# Patient Record
Sex: FEMALE | Race: OTHER | HISPANIC OR LATINO | URBAN - METROPOLITAN AREA
[De-identification: names, ages, dates, MRNs, and addresses within clinical notes are randomized per-mention and may not be internally consistent; named-entity substitution may affect disease eponyms.]

---

## 2017-09-20 ENCOUNTER — EMERGENCY (EMERGENCY)
Facility: HOSPITAL | Age: 52
LOS: 1 days | Discharge: HOME | End: 2017-09-20
Attending: EMERGENCY MEDICINE
Payer: COMMERCIAL

## 2017-09-20 VITALS
HEART RATE: 80 BPM | DIASTOLIC BLOOD PRESSURE: 77 MMHG | SYSTOLIC BLOOD PRESSURE: 104 MMHG | WEIGHT: 125 LBS | RESPIRATION RATE: 16 BRPM | HEIGHT: 65 IN | OXYGEN SATURATION: 100 % | TEMPERATURE: 97 F

## 2017-09-20 DIAGNOSIS — R21 RASH AND OTHER NONSPECIFIC SKIN ERUPTION: ICD-10-CM

## 2017-09-20 DIAGNOSIS — Z88.3 ALLERGY STATUS TO OTHER ANTI-INFECTIVE AGENTS: ICD-10-CM

## 2017-09-20 DIAGNOSIS — E78.5 HYPERLIPIDEMIA, UNSPECIFIED: ICD-10-CM

## 2017-09-20 DIAGNOSIS — A52.16 CHARCOT'S ARTHROPATHY (TABETIC): ICD-10-CM

## 2017-09-20 PROCEDURE — 99283 EMERGENCY DEPT VISIT LOW MDM: CPT

## 2017-09-20 RX ORDER — HYDROCORTISONE 1 %
1 OINTMENT (GRAM) TOPICAL
Qty: 1 | Refills: 0 | OUTPATIENT
Start: 2017-09-20 | End: 2017-09-27

## 2017-09-20 RX ADMIN — Medication 50 MILLIGRAM(S): at 13:55

## 2017-09-20 NOTE — ED ADULT NURSE NOTE - OBJECTIVE STATEMENT
AOX3 +ambulatory patient reports AOX3 +ambulatory patient reports rash on papito buttocks today. Patient denies any sob, did not used new lotions or soap. Patient seen and examined by GENE De Luna with [facility].

## 2017-09-20 NOTE — ED PROVIDER NOTE - PROGRESS NOTE DETAILS
No e/o erythema multiforme, SJS/TEN, Lyme, cellulitis, necrotizing fasciitis, no angioedema, meningococcemia, jose mountain spotted fever. Will refer to derm in 1-2 days and give Rx for Hydrocortisone cream. Pt is well appearing walking with steady gait, stable for discharge and follow up without fail with medical doctor. I had a detailed discussion with the patient and/or guardian regarding the historical points, exam findings, and any diagnostic results supporting the discharge diagnosis. Pt educated on care and need for follow up. Strict return instructions and red flag signs and symptoms discussed with patient. Questions answered. Pt shows understanding of discharge information and agrees to follow.

## 2017-09-20 NOTE — ED PROVIDER NOTE - ATTENDING CONTRIBUTION TO CARE
I performed a face to face bedside interview with patient regarding history of present illness, review of symptoms and past medical history. I completed an independent physical exam.  I have discussed patient's plan of care.   I agree with note as stated above, having amended the EMR as needed to reflect my findings. I have discussed the assessment and plan of care.  This includes during the time I functioned as the attending physician for this patient.  Attending Contribution to Care: agree with plan of NP. pt stable, p/w vague plaque rash diffusely. no change of meds. pt with no signs of CANDI chavez, no exposure to new things. not appearing similar to bug bite. pt referred to derm for further eval. PO steroids started by np. pt tolerated.

## 2017-09-20 NOTE — ED PROVIDER NOTE - MEDICAL DECISION MAKING DETAILS
51 year-old female, presents with rash to buttocks, breast and thigh since today. Well-appearing, No e/o erythema multiforme, SJS/TEN, Lyme, cellulitis, necrotizing fasciitis, no angioedema, meningococcemia, jose mountain spotted fever. Plan: Hydrocortisone crea RX and derm follow up in 1-2 days.

## 2017-09-20 NOTE — ED PROVIDER NOTE - OBJECTIVE STATEMENT
51 year-old female, history of dyslipidemia, Charcot-Jolene disease, presents with cc rash today. Sudden onset of rash noticed on both buttocks, breast and right lateral thigh today. Rash is pruritic with mild pain. Denies fever, chills, body aches, joint pain, recent travel/exposure to woods or insect bites, new product uses, similar rash in other members of household.

## 2017-09-20 NOTE — ED PROVIDER NOTE - PHYSICAL EXAMINATION
Round well-circumscribed red patches on both buttocks, 1 patch on right breast and 1 patch right lateral thigh. Non-tender, not raised, non-streaking, nondraining.

## 2018-10-03 ENCOUNTER — RESULT REVIEW (OUTPATIENT)
Age: 53
End: 2018-10-03

## 2019-03-22 NOTE — ED PROVIDER NOTE - CROS ED CONS ALL NEG
Pt wife reports that she is not familiar with driving downtown and does not believe she would be able to get Nasreen Jimenez down to [address]. She is inquiring if PCP has any recommendation for a geriatrician closer to her location or if general referral can be placed. Also she wants to confirm with B12 result if patient is to continue injections. Okay for 3/25/19. Please see Glendy's concerns above and confirm MD recommendations. negative...

## 2019-04-10 PROBLEM — E78.5 HYPERLIPIDEMIA, UNSPECIFIED: Chronic | Status: ACTIVE | Noted: 2017-09-20

## 2019-04-10 PROBLEM — G60.0 HEREDITARY MOTOR AND SENSORY NEUROPATHY: Chronic | Status: ACTIVE | Noted: 2017-09-20

## 2019-04-29 ENCOUNTER — APPOINTMENT (OUTPATIENT)
Dept: ORTHOPEDIC SURGERY | Facility: CLINIC | Age: 54
End: 2019-04-29
Payer: MEDICARE

## 2019-04-29 VITALS
SYSTOLIC BLOOD PRESSURE: 108 MMHG | WEIGHT: 127 LBS | BODY MASS INDEX: 21.68 KG/M2 | HEIGHT: 64 IN | DIASTOLIC BLOOD PRESSURE: 69 MMHG | HEART RATE: 82 BPM

## 2019-04-29 DIAGNOSIS — Z78.9 OTHER SPECIFIED HEALTH STATUS: ICD-10-CM

## 2019-04-29 DIAGNOSIS — Z87.39 PERSONAL HISTORY OF OTHER DISEASES OF THE MUSCULOSKELETAL SYSTEM AND CONNECTIVE TISSUE: ICD-10-CM

## 2019-04-29 DIAGNOSIS — K08.9 DISORDER OF TEETH AND SUPPORTING STRUCTURES, UNSPECIFIED: ICD-10-CM

## 2019-04-29 DIAGNOSIS — Z82.61 FAMILY HISTORY OF ARTHRITIS: ICD-10-CM

## 2019-04-29 DIAGNOSIS — Z86.39 PERSONAL HISTORY OF OTHER ENDOCRINE, NUTRITIONAL AND METABOLIC DISEASE: ICD-10-CM

## 2019-04-29 PROCEDURE — 73030 X-RAY EXAM OF SHOULDER: CPT | Mod: LT

## 2019-04-29 PROCEDURE — 99205 OFFICE O/P NEW HI 60 MIN: CPT

## 2019-04-29 RX ORDER — PRAVASTATIN SODIUM 20 MG/1
20 TABLET ORAL
Refills: 0 | Status: ACTIVE | COMMUNITY

## 2019-04-29 RX ORDER — CHROMIUM 200 MCG
TABLET ORAL
Refills: 0 | Status: ACTIVE | COMMUNITY

## 2019-04-29 NOTE — HISTORY OF PRESENT ILLNESS
[de-identified] : CC Left shoulder\par \par HPI 54 yo female right HD presents with acute onset of 3 months of constant pain in the lateral superior Left shoulder [without injury]. The pain is same, and rated a 7 out of 10, described as sharp and burning, [without radiation]. Rest makes the pain better and shoulder motion makes the pain worse. The patient reports associated symptoms of locking and weakness. The patient + pain at night affecting sleep, + mild neck pain, and - similar pain previously.\par \par The patient has tried the following treatments:\par Activity modification	+\par Ice			+\par Nsaids    		+\par Physical Therapy  	-\par Cortisone Injection	-\par Arthroscopy/Surgery	-\par \par Review of Systems is positive for the above musculoskeletal symptoms and is otherwise non-contributory for general, constitutional, psychiatric, neurologic, HEENT, cardiac, respiratory, gastrointestinal, reproductive, lymphatic, and dermatologic complaints.\par \par Consult by Dr Any Montes

## 2019-04-29 NOTE — PHYSICAL EXAM
[de-identified] : Physical Examination\par General: well nourished, in no acute distress, alert and oriented to person, place and time\par Psychiatric: normal mood and affect, no abnormal movements or speech patterns\par Eyes: vision intact - glasses\par Throat: no thyromegaly\par Lymph: no enlarged nodes, no lymphedema in extremity\par Respiratory: no wheezing, no shortness of breath with ambulation\par Cardiac: no cardiac leg swelling, 2+ peripheral pulses\par Neurology: normal gross sensation in extremities to light touch\par Abdomen: soft, non-tender, tympanic, no masses\par \par Musculoskeletal Examination\par Cervical spine	Full painless range of motion and = Spurling's test\par \par Shoulder			Right			Left\par Appearance\par      Skin/Swelling/Deformity	normal			normal\par      Scapular Winging		-			-\par Range of Motion\par      Forward Flexion		170 / 170		170 / 170\par      Abduction			170 / 170		170 / 170\par      External Rotation		45			45\par      Internal Rotation		T10			T10\par      SAbd Ext Rotation		90			90\par      SAbd Int Rotation		80			80\par      Painful Arc			-			+\par      Crepitus			-			-\par Palpation\par      Clavicle			-			-\par      AC Joint			-			-\par      Posterior Acromion		-			-\par      Levator Scapula		-			-\par      Lateral Bursa			-			+\par      Impingement Area		-			-\par      Biceps Tendon		-			-\par      Anterior Capsule		-			-\par Strength Examination\par      Supraspinatous 		5+ / 0			5+ / +\par      Infraspinatous			5+ / 0			5+ / +\par      Subscapularis			5+ / 0			5+ / 0\par      Belly Press			5+ / 0			5+ / 0\par      Lift Off			-			-\par      Drop-Arm			-			-\par Special Examination\par      Biceps [place]'s		-			-\par      Impingement Neer		-			-\par      Impingement Hawking		-			-\par \par      Apprehension			-			-\par           Suppression Appre		-			-\par      Anterior Subluxation		-			-\par      Posterior Subluxation		-			-\par      AC Cross-Body\par           Anterior			-			-\par           Posterior			-			-\par \par Sensation\par      Axillary			normal			normal\par      LatAntCubBrach 		normal			normal\par      Median 			normal			normal\par      Ulnar 			normal			normal\par      Radial 			normal			normal\par Motor\par      AIN 				normal			normal\par      Ulnar 			normal			normal\par      Radial 			normal			normal\par      PIN 				normal			normal\par Pulses\par      Radial			2+			2+ [de-identified] : 4 views of the affected Left shoulder (AP, Glenoid, Y-View, Axillary)\par were ordered, obtained and evaluated by myself today and\par demonstrate:\par normal bony calcification without dislocation and no fracture\par 	Arch	2B\par 	AC Joint	no Arthrosis\par 	GH Joint	trace Arthrosis\par 	Calcifications	none

## 2019-04-29 NOTE — DISCUSSION/SUMMARY
[de-identified] : Left shoulder rotator cuff syndrome\par \par \par I discussed my findings on history, exam and radiology.\par \par I reviewed the anatomy and function of the shoulder rotator cuff muscles and tendons, biceps tendon and labrum. Given the current findings for the patient, I recommend proceeding with non-operative management of the shoulder consisting of the following:\par \par Patient education about the shoulder motions causing pain and possibly injury for activity modification\par \par Ice or warm compress\par \par Physical therapy prescription with shoulder rotator cuff strengthening, aysha-scapular stabilization, ROM stretching, mobilization, modalities, HEP\par \par The patient was prescribed Diclofenac PO non-steroidal anti-inflammatory medication. 50mg tablets twice daily to be taken for at least 1-2 weeks in a row and then PRN afterwards. Risks and benefits were discussed and include but not limited to renal damage and GI ulceration and bleeding.  They were advised to take with food to limit stomach upset as well as warned to stop the medication if worsening gastric pain or dizziness or other side effects. Also to immediately stop the medication and seek appropriate medical attention if any severe stomach ache, gastritis, black/red vomit, black/red stools or any other medical concern.\par \par Patient declined a corticosteroid injection today\par \par The patient verifies their understanding the the visit, diagnosis and plan. They agree with the treatment plan and will contact the office with any questions or problems.\par \par FU after PT completion if unimproved

## 2019-11-04 ENCOUNTER — APPOINTMENT (OUTPATIENT)
Dept: ORTHOPEDIC SURGERY | Facility: CLINIC | Age: 54
End: 2019-11-04
Payer: MEDICARE

## 2019-11-04 DIAGNOSIS — M75.42 IMPINGEMENT SYNDROME OF LEFT SHOULDER: ICD-10-CM

## 2019-11-04 DIAGNOSIS — M75.22 BICIPITAL TENDINITIS, LEFT SHOULDER: ICD-10-CM

## 2019-11-04 DIAGNOSIS — M75.102 UNSPECIFIED ROTATOR CUFF TEAR OR RUPTURE OF LEFT SHOULDER, NOT SPECIFIED AS TRAUMATIC: ICD-10-CM

## 2019-11-04 PROCEDURE — 99214 OFFICE O/P EST MOD 30 MIN: CPT | Mod: 25

## 2019-11-04 PROCEDURE — 20610 DRAIN/INJ JOINT/BURSA W/O US: CPT | Mod: LT

## 2019-11-04 NOTE — HISTORY OF PRESENT ILLNESS
[de-identified] : CC Left shoulder\par \par HPI 54 yo female right HD presents for PT and nsids FU of acute onset of 3 months of constant pain in the lateral superior Left shoulder [without injury]. The pain is same, and rated a 7 out of 10, described as sharp and burning, [without radiation]. Rest makes the pain better and shoulder motion makes the pain worse. The patient reports associated symptoms of locking and weakness. The patient + pain at night affecting sleep, + mild neck pain, and - similar pain previously.\par \par The patient has tried the following treatments:\par Activity modification	+\par Ice			+\par Nsaids    		+\par Physical Therapy  	+ 3 months helped\par Cortisone Injection	-\par Arthroscopy/Surgery	-\par \par PT helped pain, but after stopping, the pain and weakness worsening\par Review of Systems is positive for the above musculoskeletal symptoms and is otherwise non-contributory for general, constitutional, psychiatric, neurologic, HEENT, cardiac, respiratory, gastrointestinal, reproductive, lymphatic, and dermatologic complaints.\par \par Consult by Dr Any Montes

## 2019-11-04 NOTE — PHYSICAL EXAM
[de-identified] : Physical Examination\par General: well nourished, in no acute distress, alert and oriented to person, place and time\par Psychiatric: normal mood and affect, no abnormal movements or speech patterns\par Eyes: vision intact - glasses\par Throat: no thyromegaly\par Lymph: no enlarged nodes, no lymphedema in extremity\par Respiratory: no wheezing, no shortness of breath with ambulation\par Cardiac: no cardiac leg swelling, 2+ peripheral pulses\par Neurology: normal gross sensation in extremities to light touch\par Abdomen: soft, non-tender, tympanic, no masses\par \par Musculoskeletal Examination\par Cervical spine	Full painless range of motion and = Spurling's test\par \par Shoulder			Right			Left\par Appearance\par      Skin/Swelling/Deformity	normal			normal\par      Scapular Winging		-			-\par Range of Motion\par      Forward Flexion		170 / 170		170 / 170\par      Abduction			170 / 170		170 / 170\par      External Rotation		45			45\par      Internal Rotation		T10			T10\par      SAbd Ext Rotation		90			90\par      SAbd Int Rotation		80			80\par      Painful Arc			-			+\par      Crepitus			-			-\par Palpation\par      Clavicle			-			-\par      AC Joint			-			-\par      Posterior Acromion		-			-\par      Levator Scapula		-			-\par      Lateral Bursa			-			+\par      Impingement Area		-			-\par      Biceps Tendon		-			-\par      Anterior Capsule		-			-\par Strength Examination\par      Supraspinatous 		5+ / 0			5- / +\par      Infraspinatous			5+ / 0			5- / +\par      Subscapularis			5+ / 0			5+ / 0\par      Belly Press			5+ / 0			5+ / 0\par      Lift Off			-			-\par      Drop-Arm			-			-\par Special Examination\par      Biceps [place]'s		-			+ up\par      Impingement Neer		-			+\par      Impingement Hawking		-			+\par \par      Apprehension			-			-\par           Suppression Appre		-			-\par      Anterior Subluxation		-			-\par      Posterior Subluxation		-			-\par      AC Cross-Body\par           Anterior			-			-\par           Posterior			-			-\par \par Sensation\par      Axillary			normal			normal\par      LatAntCubBrach 		normal			normal\par      Median 			normal			normal\par      Ulnar 			normal			normal\par      Radial 			normal			normal\par Motor\par      AIN 				normal			normal\par      Ulnar 			normal			normal\par      Radial 			normal			normal\par      PIN 				normal			normal\par Pulses\par      Radial			2+			2+ [de-identified] : 4 views of the affected Left shoulder (AP, Glenoid, Y-View, Axillary)\par 4-29-19\par demonstrate:\par normal bony calcification without dislocation and no fracture\par 	Arch	2B\par 	AC Joint	no Arthrosis\par 	GH Joint	trace Arthrosis\par 	Calcifications	none

## 2019-11-04 NOTE — DISCUSSION/SUMMARY
[de-identified] : Left shoulder rotator cuff syndrome, biceps tendonitis and impingement syndrome\par \par \par I discussed my findings on history, exam and radiology.\par \par I reviewed the anatomy and function of the shoulder rotator cuff muscles and tendons, biceps tendon and labrum. Given the current findings for the patient, I recommend proceeding with non-operative management of the shoulder consisting of the following:\par \par Patient education about the shoulder motions causing pain and possibly injury for activity modification\par \par Ice or warm compress\par \par Physical therapy prescription with shoulder rotator cuff strengthening, aysha-scapular stabilization, ROM stretching, mobilization, modalities, HEP\par \par The patient was prescribed Diclofenac PO non-steroidal anti-inflammatory medication. 50mg tablets twice daily to be taken for at least 1-2 weeks in a row and then PRN afterwards. Risks and benefits were discussed and include but not limited to renal damage and GI ulceration and bleeding.  They were advised to take with food to limit stomach upset as well as warned to stop the medication if worsening gastric pain or dizziness or other side effects. Also to immediately stop the medication and seek appropriate medical attention if any severe stomach ache, gastritis, black/red vomit, black/red stools or any other medical concern.\par \par \par Patient is not yet ready to proceed with operative management we'll hold on discussion of MRI pending another course of nonoperative management\par \par Procedure Note:\par \par Risks and benefits of a corticosteroid injection of the left shoulder subacromial bursa were discussed with the patient. Potential adverse effects were discussed including but not limited to bleeding, skin/ joint infection, local skin reactions including bleaching, bruising, stiffness, soreness, vasovagal episodes, transient hyperglycemia, avascular necrosis, pseudo-septic type reactions, post injection joint pain, allergic reaction to product or anesthetic and other rare but potential adverse effects along with benefits including decreased pain and improved stability prior to obtaining verbal informed consent. It was also discussed that for some patients the treatment is ineffective and there are no guarantees that the patient will experience improvement as the result of the injection. In rare occasions the injection can cause worsening of pain.\par \par After verbal consent, the posterolateral injection site was identified after palpating the acromion. The injection site was marked and prepped with a ChloraPrep swab and anesthetized with ethylchloride skin anesthesia. Under sterile technique a 22g 1 1/2 in needle with 3 cc total of 1cc 1% lidocaine without epinephrine, 1cc 0.25% Marcaine without epinephrine and 1cc of Kenalog 40mg/cc was passed through the injection site towards the subacromial space The medication was injected without resistance. The injection site was sterilely dressed, there was minimal blood loss. The patient tolerated this procedure without any complications done by myself.\par \par The patient has been advised that if they notice any worsening of symptoms or any problems to contact me and seek care from a qualified medical professional. The patient was instructed to ice the shoulder and take NSAID medication on an as needed basis if the patient feels discomfort.\par \par The patient verifies their understanding the the visit, diagnosis and plan. They agree with the treatment plan and will contact the office with any questions or problems.\par \par FU after PT completion if unimproved

## 2021-02-25 ENCOUNTER — APPOINTMENT (OUTPATIENT)
Dept: SURGERY | Facility: CLINIC | Age: 56
End: 2021-02-25
Payer: MEDICARE

## 2021-02-25 VITALS
HEART RATE: 90 BPM | DIASTOLIC BLOOD PRESSURE: 73 MMHG | HEIGHT: 64 IN | SYSTOLIC BLOOD PRESSURE: 120 MMHG | WEIGHT: 133 LBS | BODY MASS INDEX: 22.71 KG/M2

## 2021-02-25 PROCEDURE — 99072 ADDL SUPL MATRL&STAF TM PHE: CPT

## 2021-02-25 PROCEDURE — 99203 OFFICE O/P NEW LOW 30 MIN: CPT

## 2021-02-25 RX ORDER — DICLOFENAC SODIUM 50 MG/1
50 TABLET, DELAYED RELEASE ORAL
Qty: 60 | Refills: 1 | Status: DISCONTINUED | COMMUNITY
Start: 2019-04-29 | End: 2021-02-25

## 2021-02-25 RX ORDER — DICLOFENAC SODIUM 50 MG/1
50 TABLET, DELAYED RELEASE ORAL
Qty: 30 | Refills: 1 | Status: DISCONTINUED | COMMUNITY
Start: 2019-11-04 | End: 2021-02-25

## 2021-02-25 NOTE — ASSESSMENT
[FreeTextEntry1] : Patient is a 55 y.o F who presents with cc breast pain and itching x 1 month. Breast imaging studies reviewed. No evidence of malignancy with BI RADS cat 3 finding. Clinically, negative breast exam.

## 2021-02-25 NOTE — CONSULT LETTER
[Dear  ___] : Dear  [unfilled], [Consult Letter:] : I had the pleasure of evaluating your patient, [unfilled]. [Consult Closing:] : Thank you very much for allowing me to participate in the care of this patient.  If you have any questions, please do not hesitate to contact me. [Sincerely,] : Sincerely, [FreeTextEntry3] : Juventino Manuel MD\par

## 2021-02-25 NOTE — HISTORY OF PRESENT ILLNESS
[de-identified] : MARE AGUIRRE is a 55 year old F who is referred to the office for consultation visit, she presents w the cc of having breast pain. Patient had a BL mammo and breast US, 01/28/21, with benign appearing findings in both breasts, BI RADS cat 3. Patient also states she is feeling some itching to both breasts, for about 1 month. She denies feeling any breast lumps or nipple discharge. No personal or family history of breast CA. No hx of previous breast biopsy. Patient has reached menopause x 10 years.

## 2021-02-25 NOTE — PLAN
[FreeTextEntry1] : surgical intervention not indicated at this time ; \par continue with SBE and annual screening \par \par Patient's questions and concerns addressed to their satisfaction, and patient verbalized an understanding of the information discussed.\par \par patient will follow up if needed. \par

## 2021-02-25 NOTE — PHYSICAL EXAM
[Normal Breath Sounds] : Normal breath sounds [Normal Rate and Rhythm] : normal rate and rhythm [No Rash or Lesion] : No rash or lesion [Alert] : alert [Oriented to Person] : oriented to person [Oriented to Place] : oriented to place [Oriented to Time] : oriented to time [Calm] : calm [JVD] : no jugular venous distention  [de-identified] : A/Ox3; NAD. appears comfortable [de-identified] : EOMI; sclera anicteric. Nasal mucosa pink, septum midline. Oral mucosa pink. Tongue midline, Pharynx without exudates. [de-identified] : No mass felt in either breasts, Nipple areolar complex with no abnormalities,No skin thickening,No nipple discharge,No regional lymphadenopathy [de-identified] : Abd is soft, nondistended, no rebound or guarding. No abdominal masses. No abdominal tenderness.\par  [de-identified] : +ROM, no joint swelling

## 2021-02-25 NOTE — REVIEW OF SYSTEMS
[As Noted in HPI] : as noted in HPI [Itching] : itching [Breast Pain] : breast pain [Negative] : Heme/Lymph [Breast Lump] : no breast lump

## 2021-02-25 NOTE — DATA REVIEWED
[FreeTextEntry1] : Date of Exam: 01-\par  \par EXAM: DIGITAL BILATERAL DIAGNOSTIC MAMMOGRAM AND TOMOSYNTHESIS AND BREAST ULTRASOUND\par \par HISTORY: The patient is 55 years old and is seen for bilateral breast pain for about 2 weeks.\par \par CLINICAL BREAST EXAMINATION: The patient reports that her last clinical breast exam was within the past year. \par \par COMPARISON: The present examination has been compared to prior breast imaging studies dating back to 2018 \par \par MAMMOGRAM:\par \par TECHNIQUE: Full-field digital mammography of bilateral breasts was obtained. CC, mediolateral oblique views of bilateral breasts were obtained. Mediolateral view of the bilateral breasts was obtained. Spot compression views over the pain areas in bilateral breasts were performed in CC, mediolateral oblique projections.  Low-dose full-field digital breast tomosynthesis examination was performed with 3D acquisitions and C-View synthesized 2D reconstructed images. Computer-aided detection (CAD) was utilized. \par \par FINDINGS:\par BREAST COMPOSITION: The breasts are heterogeneously dense, which may obscure small masses.\par \par No suspicious calcifications, nodule, architectural distortion or skin thickening are seen in either side. Benign-appearing calcifications are seen bilaterally.  No suspicious findings are seen in the pain areas of breasts. \par \par BREAST ULTRASOUND:  \par \par TECHNIQUE: Bilateral breast ultrasound was performed \par \par FINDINGS: Comparison is made with the prior study dated 6/4/2020, 5/23/2016.\par \par \par On the right, an equal density circumscribed nodule or fat lobule is seen at 12:00, 3 cm from the nipple, the pain area measuring 4 x 2 x 2 mm. At 12-3 o'clock, the pain area as per patient, no sonographic abnormality is seen. At 10:00, 7 cm from the nipple, the area, a circumscribed nodule measures 6 x 2 x 3 mm. At 11:00 right breast, the pain area, and no sonographic abnormality is seen. At retroareolar region, the pain area, no sonographic abnormality is seen.\par \par On the left, No suspicious finding is seen at 12:00 to 3:00, the pain area. The previous noted nodule at 7:00, 5 cm from the nipple is not seen on the current study. A cyst is seen at 9:00, 2 cm from the nipple measuring 3 x 3 x 4 mm which is stable. No suspicious finding is seen in the retroareolar region, the pain area. In the left axilla, 13 cm from the nipple, the pain area, a normal-appearing lymph node with fatty hilum measures 1.1 x 0.4 x 0.8 cm. There is no evidence of hilar lymphadenopathy.\par \par IMPRESSION: \par 1. No mammographic evidence of malignancy in either breast.\par 2. Benign-appearing findings in bilateral breasts. Six-month follow-up targeted bilateral breast ultrasound is recommended. \par \par FOLLOW-UP: Follow-up imaging in 6 months. \par ASSESSMENT: BI-RADS Category 3:  Probably benign. \par

## 2021-03-16 ENCOUNTER — APPOINTMENT (OUTPATIENT)
Dept: NEUROLOGY | Facility: CLINIC | Age: 56
End: 2021-03-16
Payer: MEDICARE

## 2021-03-16 VITALS
BODY MASS INDEX: 22.88 KG/M2 | SYSTOLIC BLOOD PRESSURE: 96 MMHG | HEIGHT: 64 IN | OXYGEN SATURATION: 98 % | WEIGHT: 134 LBS | DIASTOLIC BLOOD PRESSURE: 54 MMHG | HEART RATE: 73 BPM | TEMPERATURE: 97.9 F

## 2021-03-16 PROCEDURE — 99072 ADDL SUPL MATRL&STAF TM PHE: CPT

## 2021-03-16 PROCEDURE — 99205 OFFICE O/P NEW HI 60 MIN: CPT

## 2021-03-16 RX ORDER — OMEPRAZOLE 10 MG/1
10 CAPSULE, DELAYED RELEASE ORAL
Refills: 0 | Status: DISCONTINUED | COMMUNITY
End: 2021-03-16

## 2021-03-16 RX ORDER — AMITRIPTYLINE HYDROCHLORIDE 25 MG/1
25 TABLET, FILM COATED ORAL
Refills: 0 | Status: ACTIVE | COMMUNITY

## 2021-03-16 RX ORDER — ALENDRONATE SODIUM 35 MG/1
35 TABLET ORAL
Refills: 0 | Status: ACTIVE | COMMUNITY

## 2021-03-16 RX ORDER — GABAPENTIN 100 MG/1
100 CAPSULE ORAL
Refills: 0 | Status: DISCONTINUED | COMMUNITY
End: 2021-03-16

## 2021-03-16 NOTE — HISTORY OF PRESENT ILLNESS
[FreeTextEntry1] : The patient is here to establish acre for CMT and be evaluated for memory problems.  She has CMT , presumed type 1 demyelinating Dx in 2008.  She used to walk on her toes as a child, she has never been able to run and has had lifelong hammertoes and bl pes cavus.  She wears AFO's bl  since 2010.  She has had falls.  Has numbness and tingling.  Has weakness.  Symptoms present in hands and legs.  \par \par Son had symptoms at age 4-5.  Has 2 other healthy sons without sings of CMT.\par Father had symptoms, age of onset unknown.\par \par The patient had problems with breathing in the past, now doing well.  No cardiac problems.  Has dry eyes.  \par \par She has memory problems since 2019.  Leaves the stove on.  Got lost in a park that she regularly goes to and going to her mother's house. Has been having trouble with doing the bills.  No trouble with ADLs.  Grandmother, aunt and mother with dementia.  No symptoms of CVA.  Had bladder incontinence in 2020, now improved, no bowel incontinence.

## 2021-03-16 NOTE — ASSESSMENT
[FreeTextEntry1] : The patient has prior Dx of DMT1 via NCS/emg, she has not had genetic testing.  On exam, her proximal strength intact in bl arms and legs, bl EDC, FDI, APB and AMD 4/5; PF 4/5 bl and DF 2/5 and EDB?EHL 2/5, she last sensory loss to small and large fiber with gait balance problems, as well as hammertoes and bl pes cavus.  \par Will plan to repeat ncs,emg of arms and legs and get CTM genetic testing from [facility].\par \par Memory problems of unclear etiology with MMSE 30/30.  Will get MRI brain to r/o cva and labs for reversible causes of dementia.  WIll consider MOCA at next visit.

## 2021-03-16 NOTE — PHYSICAL EXAM
[General Appearance - Alert] : alert [General Appearance - In No Acute Distress] : in no acute distress [Person] : oriented to person [Place] : oriented to place [Time] : oriented to time [Registration Intact] : recent registration memory intact [Concentration Intact] : normal concentrating ability [Naming Objects] : no difficulty naming common objects [Fluency] : fluency intact [Comprehension] : comprehension intact [Vocabulary] : adequate range of vocabulary [Total Score ___ / 30] : the patient achieved a score of [unfilled] /30 [Date / Time ___ / 5] : date / time [unfilled] / 5 [Place ___ / 5] : place [unfilled] / 5 [Registration ___ / 3] : registration [unfilled] / 3 [Serial Sevens ___/5] : serial sevens [unfilled] / 5 [Naming 2 Objects ___ / 2] : naming two objects [unfilled] / 2 [Repeating a Sentence ___ / 1] : repeating a sentence [unfilled] / 1 [Writing a Sentence ___ / 1] : write sentence [unfilled] / 1 [3-stage Verbal Command ___ / 3] : three-stage verbal command [unfilled] / 3 [Written Command ___ / 1] : written command [unfilled] / 1 [Copy a Design ___ / 1] : copy a design [unfilled] / 1 [Recall ___ / 3] : recall [unfilled] / 3 [Cranial Nerves Optic (II)] : visual acuity intact bilaterally,  visual fields full to confrontation, pupils equal round and reactive to light [Cranial Nerves Oculomotor (III)] : extraocular motion intact [Cranial Nerves Trigeminal (V)] : facial sensation intact symmetrically [Cranial Nerves Facial (VII)] : face symmetrical [Cranial Nerves Vestibulocochlear (VIII)] : hearing was intact bilaterally [Cranial Nerves Glossopharyngeal (IX)] : tongue and palate midline [Cranial Nerves Accessory (XI - Cranial And Spinal)] : head turning and shoulder shrug symmetric [Cranial Nerves Hypoglossal (XII)] : there was no tongue deviation with protrusion [Motor Tone] : muscle tone was normal in all four extremities [Involuntary Movements] : no involuntary movements were seen [Vibration Decrease Leg / Foot Toes Both Feet] : decreased at the toes of both feet  [Position Sensation Decrease Leg/Foot At Level Of Toes] : impaired at the toes in both legs [1+] : Patella left 1+ [0] : Ankle jerk left 0 [Short Term Intact] : short term memory impaired [Coordination - Dysmetria Impaired Finger-to-Nose Bilateral] : not present [Coordination - Dysmetria Impaired Heel-to-Shin Bilateral] : not present [Plantar Reflex Right Only] : normal on the right [Plantar Reflex Left Only] : normal on the left [FreeTextEntry4] : MMSE 30/30, completed 1 year of college in the  [FreeTextEntry6] : proximal strenght intact in bl arms and legs, bl EDC, FDI, APB and AMD 4/5; PF 4/5 bl and DF 2/5 and EDB?EHL 2/5.\par hammertoes and bl pes cavus [FreeTextEntry7] : senory loss to LT and PP [FreeTextEntry8] : Unsteady, foot drop, worse on the right

## 2021-03-16 NOTE — CONSULT LETTER
[Dear  ___] : Dear  [unfilled], [Consult Letter:] : I had the pleasure of evaluating your patient, [unfilled]. [Please see my note below.] : Please see my note below. [Consult Closing:] : Thank you very much for allowing me to participate in the care of this patient.  If you have any questions, please do not hesitate to contact me. [Sincerely,] : Sincerely, [FreeTextEntry3] : Adalgisa Negron MD, MPH\par

## 2021-03-17 LAB
FOLATE SERPL-MCNC: 16.1 NG/ML
T PALLIDUM AB SER QL IA: NEGATIVE
T3 SERPL-MCNC: 94 NG/DL
T4 SERPL-MCNC: 6.4 UG/DL
TSH SERPL-ACNC: 1.74 UIU/ML
VIT B12 SERPL-MCNC: 723 PG/ML

## 2021-03-24 LAB — METHYLMALONATE SERPL-SCNC: 138 NMOL/L

## 2021-03-25 ENCOUNTER — APPOINTMENT (OUTPATIENT)
Dept: MRI IMAGING | Facility: HOSPITAL | Age: 56
End: 2021-03-25
Payer: MEDICARE

## 2021-03-25 ENCOUNTER — OUTPATIENT (OUTPATIENT)
Dept: OUTPATIENT SERVICES | Facility: HOSPITAL | Age: 56
LOS: 1 days | End: 2021-03-25
Payer: COMMERCIAL

## 2021-03-25 DIAGNOSIS — G60.0 HEREDITARY MOTOR AND SENSORY NEUROPATHY: ICD-10-CM

## 2021-03-25 DIAGNOSIS — R41.3 OTHER AMNESIA: ICD-10-CM

## 2021-03-25 PROCEDURE — 70551 MRI BRAIN STEM W/O DYE: CPT

## 2021-03-25 PROCEDURE — 70551 MRI BRAIN STEM W/O DYE: CPT | Mod: 26

## 2021-05-27 ENCOUNTER — APPOINTMENT (OUTPATIENT)
Dept: NEUROLOGY | Facility: CLINIC | Age: 56
End: 2021-05-27

## 2021-06-29 ENCOUNTER — APPOINTMENT (OUTPATIENT)
Dept: NEUROLOGY | Facility: CLINIC | Age: 56
End: 2021-06-29
Payer: MEDICARE

## 2021-06-29 VITALS
BODY MASS INDEX: 22.2 KG/M2 | DIASTOLIC BLOOD PRESSURE: 72 MMHG | WEIGHT: 130 LBS | SYSTOLIC BLOOD PRESSURE: 108 MMHG | OXYGEN SATURATION: 97 % | TEMPERATURE: 97.4 F | HEART RATE: 79 BPM | HEIGHT: 64 IN

## 2021-06-29 DIAGNOSIS — G60.0 HEREDITARY MOTOR AND SENSORY NEUROPATHY: ICD-10-CM

## 2021-06-29 DIAGNOSIS — R41.3 OTHER AMNESIA: ICD-10-CM

## 2021-06-29 PROCEDURE — 95911 NRV CNDJ TEST 9-10 STUDIES: CPT

## 2021-06-29 PROCEDURE — 99214 OFFICE O/P EST MOD 30 MIN: CPT | Mod: 25

## 2021-06-29 NOTE — PHYSICAL EXAM
[General Appearance - In No Acute Distress] : in no acute distress [General Appearance - Alert] : alert [FreeTextEntry4] : MoCA score 26/30, patient graduated with associate degree from the [employer]

## 2021-06-29 NOTE — DATA REVIEWED
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

## 2021-06-29 NOTE — ASSESSMENT
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 \par Will plan to repeat ncs,emg of arms\par \par Memory problems of unclear etiology with MMSE 30/30 and MoCA score today of 26/30.  Patient's MRI and labs for reversible causes of memory problems are unremarkable.  Patient says that she feels sleepy during the day, will evaluate for sleep apnea via home sleep study.\par \par fu after emg of the arms and sleep study\par \par I spent the time noted on the day of this patient encounter preparing for, providing and documenting the above E/M service and counseling and educate patient on differential, workup, disease course, and treatment/management. Education was provided to the patient during this encounter. All questions and concerns were answered and addressed in detail. The patient verbalized understanding and agreed to plan. Patient was advised to continue to monitor for neurologic symptoms and to notify my office or go to the nearest emergency room if there are any changes. Any orders/referrals and communications were provided as well. \par Side effects of the above medications were discussed in detail including but not limited to applicable black box warning and teratogenicity as appropriate. \par Patient was advised to bring previous records to my office, including CD of imaging, when applicable. \par \par

## 2021-06-29 NOTE — PROCEDURE
[FreeTextEntry3] : Nerve conduction EMG of the leg shows demyelinating motor neuropathy with axonal loss.

## 2021-06-29 NOTE — HISTORY OF PRESENT ILLNESS
[FreeTextEntry1] : The patient is here to establish acre for CMT and be evaluated for memory problems. She has CMT , presumed type 1 demyelinating Dx in 2008. She used to walk on her toes as a child, she has never been able to run and has had lifelong hammertoes and bl pes cavus. She wears AFO's bl since 2010. She has had falls. Has numbness and tingling. Has weakness. Symptoms present in hands and legs. \par \par Son had symptoms at age 4-5. Has 2 other healthy sons without sings of CMT.\par Father had symptoms, age of onset unknown.\par \par The patient had problems with breathing in the past, now doing well. No cardiac problems. Has dry eyes. \par \par She has memory problems since 2019. Leaves the stove on. Got lost in a park that she regularly goes to and going to her mother's house. Has been having trouble with doing the bills. No trouble with ADLs. Grandmother, aunt and mother with dementia. No symptoms of CVA. Had bladder incontinence in 2020, now improved, no bowel incontinence.  The patient denies sleep problems, she denies snoring.  She says that she does not feel rested when waking up in the morning.\par

## 2021-07-23 ENCOUNTER — APPOINTMENT (OUTPATIENT)
Dept: NEUROLOGY | Facility: CLINIC | Age: 56
End: 2021-07-23

## 2021-07-28 ENCOUNTER — APPOINTMENT (OUTPATIENT)
Dept: NEUROLOGY | Facility: CLINIC | Age: 56
End: 2021-07-28

## 2021-09-13 ENCOUNTER — APPOINTMENT (OUTPATIENT)
Dept: NEUROLOGY | Facility: CLINIC | Age: 56
End: 2021-09-13
Payer: MEDICARE

## 2021-09-13 VITALS
WEIGHT: 131 LBS | DIASTOLIC BLOOD PRESSURE: 62 MMHG | SYSTOLIC BLOOD PRESSURE: 96 MMHG | TEMPERATURE: 97.2 F | HEIGHT: 64 IN | BODY MASS INDEX: 22.36 KG/M2 | HEART RATE: 66 BPM | OXYGEN SATURATION: 97 %

## 2021-09-13 DIAGNOSIS — G56.01 CARPAL TUNNEL SYNDROME, RIGHT UPPER LIMB: ICD-10-CM

## 2021-09-13 PROCEDURE — 95913 NRV CNDJ TEST 13/> STUDIES: CPT

## 2021-09-13 RX ORDER — PRAVASTATIN SODIUM 10 MG/1
10 TABLET ORAL
Qty: 90 | Refills: 0 | Status: ACTIVE | COMMUNITY
Start: 2021-08-29

## 2021-09-13 RX ORDER — FLUTICASONE PROPIONATE 50 UG/1
50 SPRAY, METERED NASAL
Qty: 16 | Refills: 0 | Status: ACTIVE | COMMUNITY
Start: 2021-06-19

## 2021-09-13 RX ORDER — SODIUM CHLORIDE 0.65 %
0.65 AEROSOL, SPRAY (ML) NASAL
Qty: 44 | Refills: 0 | Status: ACTIVE | COMMUNITY
Start: 2021-05-26

## 2021-09-13 NOTE — PROCEDURE
[FreeTextEntry3] : Nerve conduction EMG of the arm shows diffuse polyneuropathy with demyelinating features and axonal loss affecting both motor and sensory nerves, as well as mild right-sided median nerve entrapment neuropathy.

## 2021-12-07 ENCOUNTER — RESULT REVIEW (OUTPATIENT)
Age: 56
End: 2021-12-07

## 2021-12-10 ENCOUNTER — APPOINTMENT (OUTPATIENT)
Dept: NEUROLOGY | Facility: CLINIC | Age: 56
End: 2021-12-10

## 2022-11-09 NOTE — ED PROVIDER NOTE - CONDUCTED A DETAILED DISCUSSION WITH PATIENT AND/OR GUARDIAN REGARDING, MDM
return to ED if symptoms worsen, persist or questions arise/need for outpatient follow-up
weight-bearing as tolerated
weight-bearing as tolerated

## 2022-11-23 NOTE — ED ADULT TRIAGE NOTE - TEMPERATURE IN FAHRENHEIT (DEGREES F)
Ellen San Patient Age: 82 year old  MESSAGE: Interpreting service used: No    Insurance on file confirmed with caller: Yes    Obstetrics & Gynecology- Reason for call: Symptom- Yellow symptoms-   Gyne- UTI/Vaginal symptoms    Patient states her vaginal is sore and has pain sometimes.          Caller connected to triage- Yes- Connected call to Call Center Triage Queue.  Routed to provider's clinical pool.    Message read back to caller for accuracy: Yes       ALLERGIES:  Acetaminophen-codeine, Codeine, Hydrocodone-acetaminophen, Nickel, Propoxyphene, Shellfish-derived products   (food or med), Vancomycin, Shellfish allergy   (food or med), Tylenol with codeine, and Latex  Current Outpatient Medications   Medication Sig Dispense Refill   • fluconazole (DIFLUCAN) 150 MG tablet Take 1 tablet by mouth 1 time for 1 dose. Repeat in 3 days 10 tablet 0   • terconazole 0.8 % vaginal cream Place 1 applicator vaginally nightly. 20 g 0   • Probiotic Product (PROBIOTIC PO) Take 1 tablet by mouth.     • Multiple Vitamin (MULTI-VITAMIN PO) Take 1 tablet by mouth.     • Polyethylene Glycol 3350 (MIRALAX PO)      • ALPRAZolam (XANAX) 0.25 MG tablet      • aspirin 81 MG EC tablet Take 81 mg by mouth.     • atorvastatin (LIPITOR) 20 MG tablet      • Cetirizine HCl 10 MG Cap Take 1 tablet by mouth.     • Coenzyme Q10 200 MG Cap Take 200 mg by mouth.     • Coenzyme Q10 100 MG Cap Take 200 mg by mouth.     • cyanocobalamin 1000 MCG/ML injection Inject 1,000 mcg into the muscle.     • docusate sodium (COLACE) 50 MG capsule Take 50 mg by mouth.     • doxycycline hyclate (VIBRA-TABS) 100 MG tablet Take 100 mg by mouth.     • metroNIDAZOLE (MetroGEL) 0.75 % gel      • Misc Natural Products (Calcium Pyruvate) 600 MG Cap Take 600 mg by mouth.     • terconazole 0.8 % vaginal cream Place 1 applicator vaginally nightly. 20 g 0     No current facility-administered medications for this visit.     PHARMACY to use: see below          Pharmacy  preference(s) on file:   [id number] - [place], [place] - [address]  [address]  [facility] [address]  Phone: [phone] Fax: [phone]      CALL BACK INFO: Ok to leave response (including medical information) on answering machine      PCP: Verify Pcp         INS: Payor: MEDICARE / Plan: PARTA AND B / Product Type: MEDICARE   PATIENT ADDRESS:  [address] [place] [address] [address]  [place] [address]     97.4

## 2024-05-07 ENCOUNTER — APPOINTMENT (OUTPATIENT)
Dept: GASTROENTEROLOGY | Facility: CLINIC | Age: 59
End: 2024-05-07
Payer: MEDICARE

## 2024-05-07 VITALS
HEART RATE: 74 BPM | OXYGEN SATURATION: 100 % | WEIGHT: 129 LBS | DIASTOLIC BLOOD PRESSURE: 69 MMHG | BODY MASS INDEX: 22.02 KG/M2 | SYSTOLIC BLOOD PRESSURE: 109 MMHG | HEIGHT: 64 IN | TEMPERATURE: 97.9 F

## 2024-05-07 DIAGNOSIS — K63.5 POLYP OF COLON: ICD-10-CM

## 2024-05-07 DIAGNOSIS — R13.10 DYSPHAGIA, UNSPECIFIED: ICD-10-CM

## 2024-05-07 PROCEDURE — 99203 OFFICE O/P NEW LOW 30 MIN: CPT

## 2024-05-07 RX ORDER — ALENDRONATE SODIUM 70 MG/1
70 TABLET ORAL
Refills: 0 | Status: ACTIVE | COMMUNITY

## 2024-05-07 RX ORDER — POLYETHYLENE GLYCOL 3350 17 G/17G
17 POWDER, FOR SOLUTION ORAL
Qty: 1 | Refills: 0 | Status: ACTIVE | COMMUNITY
Start: 2024-05-07 | End: 1900-01-01

## 2024-05-07 NOTE — HISTORY OF PRESENT ILLNESS
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 Hammer toe surgery last year --osteomylelitis, 5-7 mo on IV antibiotics.   Details re: last colonoscopy: about 5 y ago; ?polyps, doesn't remember name  Family history of colon cancer: no BM/D: 0-1 Blood/mucus: no Change in bowel habits: some constipation in the last 2 mo (no new meds); no change in water intake or diet Weight changes: lost 5-7 lb; now back at baseline  Dysphagia:+ doesn't eat too much; avoiding hard food (cuts apple in little pieces, taco--avoids tortilla) Odynophagia: no GERD sx:+; some relief with Prilosec Smoking: no NSAID use: occ Advil PM (a lot of pain at the end of the day--every other day); used to take gabapentin but it made her sleep all day and vv thirsty  CBC:

## 2024-05-07 NOTE — PLAN
[TextEntry] : 1.  Esophagram 2.  EGD and colonoscopy.  EGD indication dysphagia.  Colonoscopy indication colon cancer screening. The procedure(s) was (were) discussed in detail with the patient, including indications, alternatives and limitations.  The risks and benefits were reviewed.  If applicable, the prep directions were reviewed as well, else the patient was told to fast on the day of the procedure.

## 2024-05-07 NOTE — ASSESSMENT
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 Long having performed a colonoscopy approximately 5 years ago and finding polyps, further details lacking). The patient is also complaining of approximately a years worth of dysphagia resulting in a choking feeling with hard foods but occasionally also with saliva.  A brief literature search did reveal that Charcot-Jolene-Tooth can involve the esophagus occasionally, so it is possible that I need to refer her to an esophageal motility specialist after these tests.

## 2024-05-22 ENCOUNTER — APPOINTMENT (OUTPATIENT)
Dept: GASTROENTEROLOGY | Facility: HOSPITAL | Age: 59
End: 2024-05-22

## 2024-07-15 ENCOUNTER — NON-APPOINTMENT (OUTPATIENT)
Age: 59
End: 2024-07-15

## 2024-07-26 ENCOUNTER — APPOINTMENT (OUTPATIENT)
Dept: GASTROENTEROLOGY | Facility: CLINIC | Age: 59
End: 2024-07-26
Payer: MEDICARE

## 2024-07-26 ENCOUNTER — TRANSCRIPTION ENCOUNTER (OUTPATIENT)
Age: 59
End: 2024-07-26

## 2024-07-26 PROCEDURE — 99441: CPT | Mod: 93

## 2024-07-26 NOTE — HISTORY OF PRESENT ILLNESS
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 Long having performed a colonoscopy approximately 5 years ago and finding polyps, further details lacking). The patient is also complaining of approximately a years worth of dysphagia resulting in a choking feeling with hard foods but occasionally also with saliva. A brief literature search did reveal that Charcot-Jolene-Tooth can involve the esophagus occasionally, so it is possible that I need to refer her to an esophageal motility specialist after these tests.  Plan at last ov 5/7/24: 1. Esophagram - 7/16/24 normal 2. EGD/colon - not scheduled yet; has more time after 8/13/24

## 2024-08-26 ENCOUNTER — OUTPATIENT (OUTPATIENT)
Dept: OUTPATIENT SERVICES | Facility: HOSPITAL | Age: 59
LOS: 1 days | End: 2024-08-26
Payer: COMMERCIAL

## 2024-08-26 ENCOUNTER — APPOINTMENT (OUTPATIENT)
Dept: GASTROENTEROLOGY | Facility: HOSPITAL | Age: 59
End: 2024-08-26

## 2024-08-26 ENCOUNTER — TRANSCRIPTION ENCOUNTER (OUTPATIENT)
Age: 59
End: 2024-08-26

## 2024-08-26 VITALS
TEMPERATURE: 98 F | SYSTOLIC BLOOD PRESSURE: 101 MMHG | RESPIRATION RATE: 16 BRPM | DIASTOLIC BLOOD PRESSURE: 65 MMHG | HEIGHT: 64 IN | HEART RATE: 74 BPM | WEIGHT: 132.06 LBS | OXYGEN SATURATION: 100 %

## 2024-08-26 VITALS
DIASTOLIC BLOOD PRESSURE: 64 MMHG | RESPIRATION RATE: 16 BRPM | SYSTOLIC BLOOD PRESSURE: 96 MMHG | OXYGEN SATURATION: 100 % | HEART RATE: 68 BPM

## 2024-08-26 DIAGNOSIS — K63.5 POLYP OF COLON: ICD-10-CM

## 2024-08-26 DIAGNOSIS — Z98.890 OTHER SPECIFIED POSTPROCEDURAL STATES: Chronic | ICD-10-CM

## 2024-08-26 DIAGNOSIS — Z98.891 HISTORY OF UTERINE SCAR FROM PREVIOUS SURGERY: Chronic | ICD-10-CM

## 2024-08-26 DIAGNOSIS — R13.10 DYSPHAGIA, UNSPECIFIED: ICD-10-CM

## 2024-08-26 PROCEDURE — 43239 EGD BIOPSY SINGLE/MULTIPLE: CPT

## 2024-08-26 PROCEDURE — 45380 COLONOSCOPY AND BIOPSY: CPT

## 2024-08-26 PROCEDURE — 45385 COLONOSCOPY W/LESION REMOVAL: CPT | Mod: PT

## 2024-08-26 PROCEDURE — 88312 SPECIAL STAINS GROUP 1: CPT

## 2024-08-26 PROCEDURE — 88312 SPECIAL STAINS GROUP 1: CPT | Mod: 26

## 2024-08-26 PROCEDURE — 88305 TISSUE EXAM BY PATHOLOGIST: CPT

## 2024-08-26 PROCEDURE — 88305 TISSUE EXAM BY PATHOLOGIST: CPT | Mod: 26

## 2024-08-28 LAB — SURGICAL PATHOLOGY STUDY: SIGNIFICANT CHANGE UP

## 2024-09-12 ENCOUNTER — APPOINTMENT (OUTPATIENT)
Dept: NEUROLOGY | Facility: CLINIC | Age: 59
End: 2024-09-12

## 2024-09-17 ENCOUNTER — NON-APPOINTMENT (OUTPATIENT)
Age: 59
End: 2024-09-17

## 2024-10-29 ENCOUNTER — APPOINTMENT (OUTPATIENT)
Dept: NEUROLOGY | Facility: CLINIC | Age: 59
End: 2024-10-29
Payer: MEDICARE

## 2024-10-29 ENCOUNTER — NON-APPOINTMENT (OUTPATIENT)
Age: 59
End: 2024-10-29

## 2024-10-29 VITALS
WEIGHT: 127 LBS | DIASTOLIC BLOOD PRESSURE: 69 MMHG | HEIGHT: 64 IN | SYSTOLIC BLOOD PRESSURE: 104 MMHG | TEMPERATURE: 97.6 F | HEART RATE: 77 BPM | BODY MASS INDEX: 21.68 KG/M2 | OXYGEN SATURATION: 99 %

## 2024-10-29 DIAGNOSIS — M81.0 AGE-RELATED OSTEOPOROSIS W/OUT CURRENT PATHOLOGICAL FRACTURE: ICD-10-CM

## 2024-10-29 DIAGNOSIS — G60.0 HEREDITARY MOTOR AND SENSORY NEUROPATHY: ICD-10-CM

## 2024-10-29 DIAGNOSIS — G56.01 CARPAL TUNNEL SYNDROME, RIGHT UPPER LIMB: ICD-10-CM

## 2024-10-29 PROCEDURE — 99205 OFFICE O/P NEW HI 60 MIN: CPT

## 2024-10-29 RX ORDER — DULOXETINE HYDROCHLORIDE 20 MG/1
20 CAPSULE, DELAYED RELEASE PELLETS ORAL
Qty: 30 | Refills: 5 | Status: ACTIVE | COMMUNITY
Start: 2024-10-29 | End: 1900-01-01

## 2024-12-03 ENCOUNTER — APPOINTMENT (OUTPATIENT)
Dept: NEUROLOGY | Facility: CLINIC | Age: 59
End: 2024-12-03
Payer: MEDICARE

## 2024-12-03 DIAGNOSIS — G60.0 HEREDITARY MOTOR AND SENSORY NEUROPATHY: ICD-10-CM

## 2024-12-03 PROCEDURE — 99443: CPT | Mod: 93

## 2024-12-04 ENCOUNTER — APPOINTMENT (OUTPATIENT)
Dept: NEUROLOGY | Facility: CLINIC | Age: 59
End: 2024-12-04

## 2025-08-21 ENCOUNTER — TRANSCRIPTION ENCOUNTER (OUTPATIENT)
Age: 60
End: 2025-08-21

## 2025-08-28 ENCOUNTER — TRANSCRIPTION ENCOUNTER (OUTPATIENT)
Age: 60
End: 2025-08-28

## 2025-09-10 ENCOUNTER — TRANSCRIPTION ENCOUNTER (OUTPATIENT)
Age: 60
End: 2025-09-10

## (undated) DEVICE — SALIVA EJECTOR (BLUE)

## (undated) DEVICE — LUBRICATING JELLY HR ONE SHOT 3G

## (undated) DEVICE — KIT ENDO PROCEDURE CUST W/VLV

## (undated) DEVICE — DRSG CURITY GAUZE SPONGE 4 X 4" 12-PLY NON-STERILE

## (undated) DEVICE — TUBING ENDO EXT OLYMPUS 160 24HR USE GI

## (undated) DEVICE — MASK LRG MED AND HIGH O2 CONC M TO M 10FT

## (undated) DEVICE — GOWN LG

## (undated) DEVICE — SNARE CAPTIVATOR II 15MM

## (undated) DEVICE — PLATE NESSY 170

## (undated) DEVICE — DRSG 2X2

## (undated) DEVICE — CONTAINER FORMALIN 10% 20ML

## (undated) DEVICE — ELCTR ECG CONDUCTIVE ADHESIVE

## (undated) DEVICE — CATH IV SAFE BC 22G X 1" (BLUE)

## (undated) DEVICE — PACK IV START WITH CHG

## (undated) DEVICE — BIOPSY FORCEP COLD DISP

## (undated) DEVICE — SOLIDIFIER 1200CC

## (undated) DEVICE — ELCTR GROUNDING PAD ADULT COVIDIEN

## (undated) DEVICE — TRAP SUCTION POLYP ENDODYNAMIC

## (undated) DEVICE — BITE BLOCK MOUTHPCW/STRAP

## (undated) DEVICE — DRSG BANDAID 0.75X3"

## (undated) DEVICE — TUBING SUCTION NONCONDUCTIVE 6MM X 12FT

## (undated) DEVICE — TUBING MEDI-VAC W MAXIGRIP CONNECTORS 1/4"X6'

## (undated) DEVICE — BIOPSY FORCEP RADIAL JAW 4 STANDARD WITH NEEDLE

## (undated) DEVICE — BASIN EMESIS 10IN GRADUATED MAUVE

## (undated) DEVICE — TUBING IV SET GRAVITY 3Y 100" MACRO

## (undated) DEVICE — Device